# Patient Record
Sex: MALE
[De-identification: names, ages, dates, MRNs, and addresses within clinical notes are randomized per-mention and may not be internally consistent; named-entity substitution may affect disease eponyms.]

---

## 2018-10-27 ENCOUNTER — HOSPITAL ENCOUNTER (EMERGENCY)
Dept: HOSPITAL 42 - ED | Age: 38
LOS: 1 days | Discharge: HOME | End: 2018-10-28
Payer: MEDICAID

## 2018-10-27 VITALS — BODY MASS INDEX: 48.4 KG/M2

## 2018-10-27 DIAGNOSIS — S61.211A: Primary | ICD-10-CM
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
## 2018-10-27 DIAGNOSIS — Z23: ICD-10-CM
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
## 2018-10-28 VITALS — HEART RATE: 89 BPM | DIASTOLIC BLOOD PRESSURE: 90 MMHG | RESPIRATION RATE: 18 BRPM | SYSTOLIC BLOOD PRESSURE: 168 MMHG

## 2018-10-28 VITALS — TEMPERATURE: 98.6 F | OXYGEN SATURATION: 100 %

## 2018-10-28 NOTE — RAD
PROCEDURE:  Left Hand Radiographs.



HISTORY:

 laceration r/o FB or fracture 



COMPARISON:

None.



FINDINGS:



BONES:

Normal. No fracture. 



JOINTS:

Normal. No osteoarthritic changes. 



SOFT TISSUES:

There is moderate soft tissue swelling overlying the 5th metacarpal.  

No radiopaque foreign body. 



OTHER FINDINGS:

None.



IMPRESSION:

No acute fracture or dislocation.  Soft tissue swelling overlying the 

5th metacarpal.  No radiopaque foreign body.
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
